# Patient Record
Sex: MALE | Race: WHITE
[De-identification: names, ages, dates, MRNs, and addresses within clinical notes are randomized per-mention and may not be internally consistent; named-entity substitution may affect disease eponyms.]

---

## 2017-06-05 ENCOUNTER — HOSPITAL ENCOUNTER (OUTPATIENT)
Dept: HOSPITAL 53 - M RAD | Age: 27
End: 2017-06-05
Attending: OTOLARYNGOLOGY
Payer: OTHER GOVERNMENT

## 2017-06-05 DIAGNOSIS — J33.9: ICD-10-CM

## 2017-06-05 DIAGNOSIS — J32.4: Primary | ICD-10-CM

## 2017-06-05 DIAGNOSIS — J34.89: ICD-10-CM

## 2017-06-05 NOTE — REP
MAXILLOFACIAL CT WITHOUT CONTRAST:

 

HISTORY: Chronic pansinusitis.

 

The patient is status post bilateral medial antrostomy, right uncinectomy,

partial left uncinectomy, partial bilateral ethmoidectomy and partial right

middle nasal turbinectomy.   Mucosal thickening is present in the sinuses. There

is complete opacification of the frontal, sphenoid and left ethmoid sinuses.

There is almost complete opacification of the maxillary and right ethmoid

sinuses. The inferior and left middle nasal turbinates are partially paradoxical.

There is minimal deviation of the nasal septum to the left. A spur is present

arising from the left side of the nasal septum. There is dehiscence of the medial

wall of the right orbit. The medial wall of the left orbit, cribriform plate and

optic canals are intact. The carotid canals form a segment of the

posterolateral       walls of the sphenoid sinus. The sphenoid sinus    septum

inserts into the right internal carotid canal wall. Soft tissue densities are

present in the nasal passage consistent with polyps.

 

IMPRESSION:

 

1. Postoperative change as described above.

 

2. Sinus mucosal thickening as described above.

 

3. There are soft tissue densities in the nasal passage consistent with polyps.

 

 

Signed by

Dewey Guevara MD 06/06/2017 09:40 A

## 2017-07-11 ENCOUNTER — HOSPITAL ENCOUNTER (OUTPATIENT)
Dept: HOSPITAL 53 - M SDC | Age: 27
Discharge: HOME | End: 2017-07-11
Attending: OTOLARYNGOLOGY
Payer: OTHER GOVERNMENT

## 2017-07-11 VITALS — DIASTOLIC BLOOD PRESSURE: 89 MMHG | SYSTOLIC BLOOD PRESSURE: 136 MMHG

## 2017-07-11 VITALS — HEIGHT: 70 IN | BODY MASS INDEX: 23.62 KG/M2 | WEIGHT: 165 LBS

## 2017-07-11 DIAGNOSIS — J33.9: ICD-10-CM

## 2017-07-11 DIAGNOSIS — J34.2: Primary | ICD-10-CM

## 2017-07-11 DIAGNOSIS — J32.4: ICD-10-CM

## 2017-07-11 PROCEDURE — 31287 NASAL/SINUS ENDOSCOPY SURG: CPT

## 2017-07-11 PROCEDURE — 31256 EXPLORATION MAXILLARY SINUS: CPT

## 2017-07-11 PROCEDURE — 31255 NSL/SINS NDSC W/TOT ETHMDCT: CPT

## 2017-07-11 PROCEDURE — 30520 REPAIR OF NASAL SEPTUM: CPT

## 2017-07-11 PROCEDURE — 88300 SURGICAL PATH GROSS: CPT

## 2017-07-11 PROCEDURE — 31237 NSL/SINS NDSC SURG BX POLYPC: CPT

## 2017-07-11 NOTE — RO
DATE OF PROCEDURE:  07/11/2017

 

PREPROCEDURE DIAGNOSIS:  Chronic rhinosinusitis with polyposis.

 

POSTPROCEDURE DIAGNOSIS:  Chronic rhinosinusitis with polyposis plus nasal septal

deviation.

 

PROCEDURE:  Septoplasty, bilateral polypectomies, ethmoidectomies, nasal frontal

sinusotomy with balloon dilation, ethmoidectomies, antrostomies and sphenoidotomy

with bilateral frontal sinus lavage.

 

SURGEON:  Dr. Estrella Neville

 

ASSISTANT:

 

ANESTHESIA:

 

FINDINGS: There was bilateral nasal polyp disease. The navigational system was

used during the procedure.

 

DESCRIPTION OF PROCEDURE:  Under general anesthesia with the patient intubated,

the patient was draped in the usual manner. I used pledgets of adrenaline 1:1000

and infiltrated with lidocaine with epinephrine.

 

I started first by making an incision anteriorly in the septum, elevated the

subperichondrial periosteal plane. I removed portions of the ethmoid plate and

vomer, which were deviated, as well as portion of the maxillary crest. Once this

was done, the septum was straight and so I could approach the sinus as well. That

incision was closed with a #4-0 Vicryl, #4-0 chromic. I started first on the

right side and then I alternated back on both sides. Additionally, I removed the

polypoid tissue as much as possible. The middle turbinate on the left side was

affixed to the lateral nasal wall so I tried elevating it, but it fractured and I

ended up removing much of it. I opened up the anterior and posterior ethmoid air

cells. I did the same on the opposite side, except that I preserved the middle

turbinate.

 

Next, I opened into the sphenoid. There was mucopus in both sides, which was

suctioned as well. Once that was done, then I identified the maxillary sinus and

then removed the medial wall of the maxillary sinus, middle meatus on both sides.

There was some polypoid tissue within the maxillary sinus, which were removed

with a microdebrider.

 

Next, I opened up the nasofrontal area, identified the nasofrontal opening. I

then passed the guidewire up into the sinus and then passed the balloon over

that. The balloon was inflated. Then, I irrigated the frontal sinus and removed

the purulent fluid. The same procedure was performed on both sides.

 

The patient tolerated the procedure well. There was probably 50 mL estimated

blood loss at maximum. I did put a Propel implant between the middle turbinate

and lateral nasal wall on the left side.

 

The patient was then extubated and transferred to the recovery room in excellent

condition.

## 2018-05-08 ENCOUNTER — HOSPITAL ENCOUNTER (OUTPATIENT)
Dept: HOSPITAL 53 - M CARPUL | Age: 28
End: 2018-05-08
Payer: COMMERCIAL

## 2018-05-08 DIAGNOSIS — R06.02: Primary | ICD-10-CM

## 2018-05-08 PROCEDURE — 94060 EVALUATION OF WHEEZING: CPT

## 2018-05-17 ENCOUNTER — HOSPITAL ENCOUNTER (OUTPATIENT)
Dept: HOSPITAL 53 - M CARPUL | Age: 28
End: 2018-05-17
Payer: COMMERCIAL

## 2018-05-17 DIAGNOSIS — R06.02: Primary | ICD-10-CM
